# Patient Record
Sex: MALE | Race: WHITE | Employment: FULL TIME | URBAN - METROPOLITAN AREA
[De-identification: names, ages, dates, MRNs, and addresses within clinical notes are randomized per-mention and may not be internally consistent; named-entity substitution may affect disease eponyms.]

---

## 2017-05-17 ENCOUNTER — OFFICE VISIT (OUTPATIENT)
Dept: ORTHOPEDIC SURGERY | Facility: CLINIC | Age: 57
End: 2017-05-17

## 2017-05-17 VITALS
WEIGHT: 215 LBS | SYSTOLIC BLOOD PRESSURE: 156 MMHG | TEMPERATURE: 98 F | DIASTOLIC BLOOD PRESSURE: 87 MMHG | BODY MASS INDEX: 26.87 KG/M2 | HEART RATE: 78 BPM

## 2017-05-17 DIAGNOSIS — S76.311A STRAIN OF HAMSTRING MUSCLE, RIGHT, INITIAL ENCOUNTER: Primary | ICD-10-CM

## 2017-05-17 NOTE — PROGRESS NOTES
Patient: Antionette Jaimes                MRN: [id number]       SSN: [id number]  YOB: 1960        AGE: 64 y.o. SEX: male  There is no height or weight on file to calculate BMI. PCP: Martha Vuong MD  05/17/17      No chief complaint on file. HISTORY OF PRESENT ILLNESS: Antionette Jaimes is a 64 y.o. male who presents to the office for right LE swelling and pain. Approximately 1 week ago he was awakened by severe hamstring cramps and tightness. Upon awaking the next day, he noted gradual swelling and pain along the medial right thigh region. Subsequently over the next few days bruising and selling occurred. He has had pain primarily in the thigh with some numbness and paresthesia along the medial thigh down to the level of the knee. He has had no previous similar injury. Review of Systems   Constitutional: Negative. HENT: Negative. Eyes: Negative. Respiratory: Negative. Cardiovascular: Negative. Gastrointestinal: Negative. Genitourinary: Negative. Musculoskeletal: Positive for myalgias (right leg). Skin: Negative. Neurological: Negative. Endo/Heme/Allergies: Negative. Psychiatric/Behavioral: Negative. Social History     Social History    Marital status:      Spouse name: N/A    Number of children: N/A    Years of education: N/A     Occupational History    Not on file.      Social History Main Topics    Smoking status: Never Smoker    Smokeless tobacco: Not on file    Alcohol use Yes      Comment: Occasionally; 3 or 4 beers per week    Drug use: Not on file    Sexual activity: Not on file     Other Topics Concern    Not on file     Social History Narrative        Past Medical History:   Diagnosis Date    Arthritis     Right arm pain     Pain after lifting a desk        No Known Allergies      Current Outpatient Prescriptions   Medication Sig    cephALEXin (KEFLEX) 250 mg capsule Take 500 mg by mouth four (4) times daily.  doxycycline (ADOXA) 100 mg tablet Take 1 Tab by mouth two (2) times a day.  amoxicillin-clavulanate (AUGMENTIN) 1,000-62.5 mg per tablet     amoxicillin-clavulanate (AUGMENTIN) 875-125 mg per tablet     metFORMIN (GLUCOPHAGE) 500 mg tablet Take  by mouth two (2) times daily (with meals).  naproxen (NAPROSYN) 500 mg tablet Take 500 mg by mouth two (2) times daily (with meals). No current facility-administered medications for this visit. Physical Exam  Constitutional: he is oriented to person, place, and time and well-developed, well-nourished, and in no distress. No distress. HENT:   Head: Normocephalic and atraumatic. Right Ear: Hearing normal.   Left Ear: Hearing normal.   Nose: Nose normal.   Eyes: Conjunctivae, EOM and lids are normal. Pupils are equal, round, and reactive to light. Neck: Trachea normal.   Pulmonary/Chest: Effort normal and breath sounds normal. No respiratory distress. Abdominal: Soft. Neurological: he is alert and oriented to person, place, and time. Skin: Skin is warm, dry and intact. he is not diaphoretic. Psychiatric: Affect normal.   Nursing note and vitals reviewed. Ortho Exam   Right LE shows bruising involving the entire medial right thigh along the medial aspect of the calf region. He was able to dorsiflex and plantar flex without pain and the calf was soft and calf was not tender. Flexion of the knee against resistance caused pain along the hamstring tendon. RADIOGRAPHS:   No new XR's taken today. IMPRESSION & PLAN: Hamstring strain with muscle fiber tearing resulting in the bruising and swelling but no evidence of DVT. Since the pain is improving but work at the [employer], I have given him a note with restrictions for the next few weeks. I will see him back prn. Written by Liyah Connor, as dictated by Dr. Leslie Pardo, Dr. Tanvir García, confirm that all documentation is accurate.

## 2017-05-17 NOTE — LETTER
NOTIFICATION RETURN TO WORK / SCHOOL 
 
5/17/2017 7:52 AM 
 
Mr. Alber Vega [address] [facility] [place] To Whom It May Concern: 
 
Alber Vega is currently under the care of [address] [place] [address]. Because of a severe hamstring strain with associated pain and swelling, please allow him to sit for 3-5 minutes every work hour to rest his painful swollen leg and please allow him to minimize as much as possible steps, stairs and ladders. These restrictions should be for the next 3-4 weeks and based on his rate of recovery. If there are questions or concerns please have the patient contact our office. Sincerely, Karie Lopez MD

## 2017-10-03 ENCOUNTER — HOSPITAL ENCOUNTER (OUTPATIENT)
Dept: LAB | Age: 57
Discharge: HOME | End: 2017-10-03
Payer: OTHER GOVERNMENT

## 2017-10-03 PROCEDURE — 87102 FUNGUS ISOLATION CULTURE: CPT | Performed by: SURGERY

## 2017-10-03 PROCEDURE — 87186 SC STD MICRODIL/AGAR DIL: CPT | Performed by: SURGERY

## 2017-10-03 PROCEDURE — 88305 TISSUE EXAM BY PATHOLOGIST: CPT | Performed by: SURGERY

## 2017-10-03 PROCEDURE — 87116 MYCOBACTERIA CULTURE: CPT | Performed by: SURGERY

## 2017-10-03 PROCEDURE — 87070 CULTURE OTHR SPECIMN AEROBIC: CPT | Performed by: SURGERY

## 2017-10-03 PROCEDURE — 87076 CULTURE ANAEROBE IDENT EACH: CPT | Performed by: SURGERY

## 2017-10-03 PROCEDURE — 87077 CULTURE AEROBIC IDENTIFY: CPT | Performed by: SURGERY

## 2017-10-03 PROCEDURE — 87075 CULTR BACTERIA EXCEPT BLOOD: CPT | Performed by: SURGERY

## 2017-10-07 LAB
BACTERIA SPEC CULT: ABNORMAL
GRAM STN SPEC: ABNORMAL
GRAM STN SPEC: ABNORMAL
SERVICE CMNT-IMP: ABNORMAL

## 2017-10-09 LAB
BACTERIA SPEC CULT: ABNORMAL
SERVICE CMNT-IMP: ABNORMAL

## 2017-11-06 LAB
BACTERIA SPEC CULT: NORMAL
FUNGUS SMEAR,FNGSMR: NORMAL
SERVICE CMNT-IMP: NORMAL

## 2017-11-17 LAB
ACID FAST STN SPEC: NEGATIVE
MYCOBACTERIUM SPEC QL CULT: NEGATIVE
SPECIMEN PREPARATION: NORMAL
SPECIMEN SOURCE: NORMAL

## 2021-07-09 ENCOUNTER — TRANSCRIBE ORDER (OUTPATIENT)
Dept: SCHEDULING | Age: 61
End: 2021-07-09

## 2021-07-09 DIAGNOSIS — N18.9 CHRONIC KIDNEY DISEASE, UNSPECIFIED: Primary | ICD-10-CM

## 2021-07-29 ENCOUNTER — HOSPITAL ENCOUNTER (OUTPATIENT)
Dept: ULTRASOUND IMAGING | Age: 61
Discharge: HOME | End: 2021-07-29
Attending: INTERNAL MEDICINE
Payer: OTHER GOVERNMENT

## 2021-07-29 DIAGNOSIS — N18.9 CHRONIC KIDNEY DISEASE, UNSPECIFIED: ICD-10-CM

## 2021-07-29 PROCEDURE — 76770 US EXAM ABDO BACK WALL COMP: CPT
